# Patient Record
Sex: FEMALE | Race: WHITE | NOT HISPANIC OR LATINO
[De-identification: names, ages, dates, MRNs, and addresses within clinical notes are randomized per-mention and may not be internally consistent; named-entity substitution may affect disease eponyms.]

---

## 2021-12-09 PROBLEM — Z00.00 ENCOUNTER FOR PREVENTIVE HEALTH EXAMINATION: Status: ACTIVE | Noted: 2021-12-09

## 2021-12-14 ENCOUNTER — NON-APPOINTMENT (OUTPATIENT)
Age: 31
End: 2021-12-14

## 2021-12-15 ENCOUNTER — NON-APPOINTMENT (OUTPATIENT)
Age: 31
End: 2021-12-15

## 2021-12-15 ENCOUNTER — APPOINTMENT (OUTPATIENT)
Dept: BREAST CENTER | Facility: CLINIC | Age: 31
End: 2021-12-15
Payer: COMMERCIAL

## 2021-12-15 VITALS
BODY MASS INDEX: 25.11 KG/M2 | HEIGHT: 67 IN | SYSTOLIC BLOOD PRESSURE: 114 MMHG | DIASTOLIC BLOOD PRESSURE: 79 MMHG | WEIGHT: 160 LBS | HEART RATE: 61 BPM

## 2021-12-15 DIAGNOSIS — Z78.9 OTHER SPECIFIED HEALTH STATUS: ICD-10-CM

## 2021-12-15 PROCEDURE — 99203 OFFICE O/P NEW LOW 30 MIN: CPT

## 2021-12-15 NOTE — CONSULT LETTER
[Dear  ___] : Dear  [unfilled], [( Thank you for referring [unfilled] for consultation for _____ )] : Thank you for referring [unfilled] for consultation for [unfilled] [Please see my note below.] : Please see my note below. [Consult Closing:] : Thank you very much for allowing me to participate in the care of this patient.  If you have any questions, please do not hesitate to contact me. [Sincerely,] : Sincerely, [DrSelma  ___] : Dr. ENRIQUEZ [FreeTextEntry3] : Christen Thomson MS DO\par Breast Surgeon\par [facility] \[place] [address]\par

## 2021-12-15 NOTE — PHYSICAL EXAM
[Normocephalic] : normocephalic [Atraumatic] : atraumatic [Supple] : supple [No Supraclavicular Adenopathy] : no supraclavicular adenopathy [Examined in the supine and seated position] : examined in the supine and seated position [No dominant masses] : no dominant masses in right breast  [No dominant masses] : no dominant masses left breast [No Nipple Retraction] : no left nipple retraction [No Nipple Discharge] : no left nipple discharge [No Axillary Lymphadenopathy] : no left axillary lymphadenopathy [No Edema] : no edema [No Rashes] : no rashes [No Ulceration] : no ulceration [Symmetrical] : symmetrical [de-identified] : KYLE's EDMONDQ [de-identified] : KYLE's JUNIE

## 2021-12-15 NOTE — HISTORY OF PRESENT ILLNESS
[FreeTextEntry1] : This is a 31 year old female referred by Dr. Hernandez for family history of breast CA. She has no breast complaints today. She has an 8 month old daughter. She has never had any breast imaging. Her mother is Angelina Chacko.\par \par She does not do SBE. \par She has not noticed a change in her breast or a breast lump.\par She has not noticed a change in her nipple or nipple area.\par She has not noticed a change in the skin of the breast.\par She is not experiencing nipple discharge.\par She is not experiencing breast pain.\par She has not noticed a lump or lymph node under the armpit. \par \par BREAST CANCER RISK FACTORS\par Menarche: 13 \par Date of LMP:  11/26/2021\par Menopause: pre \par Grav:  1    Para: 1 ( 8 month old daughter) \par Age at first live birth: 31\par Nursed: No \par Hysterectomy: N \par Oophorectomy: N \par OCP: Yes in the past for about 16 years \par HRT: No \par Last pap/pelvic exam: 6/2021 WNL  Dr. Hernandez [facility] \par Related family history: mother DCIS age 60, mat grandmother breast CA age 66, mat aunt breast CA age 40,mat cousin breast and ovarian Ca age 60s, mat cousin breast age 40, mat great grandmother ovarian CA 60s\par Ashkenazi: yes \par Mastery risk assessment: BRCAPRO 27.4% TCv7 40.8% TCv8 40.8% Jake 21.6%\par BRCA testing: mother tested negative\par Bra size: 36DD\par \par Last mammogram: never                              Location:\par Report reviewed.                                 Images reviewed.\par Results:\par \par Last ultrasound:   never                                Location:\par Report reviewed.                                 Images reviewed. \par Results:\par \par Last MRI:   never                                          Location:\par Report reviewed.\par

## 2021-12-15 NOTE — REVIEW OF SYSTEMS
[Lower Back Pain] : lower back pain [Problems With Nursing] : problems with nursing [Negative] : Heme/Lymph

## 2021-12-15 NOTE — ASSESSMENT
[FreeTextEntry1] : 32 yo female with high risk\par reviewed her risk status she is high risk and we will re-run at age 35\par We reviewed risk reduction strategies including maintaining a BMI <25, limiting red meat intake and alcoholic beverages to 3 per week and exercise (150 min/ week low intensity or 75 min/week high intensity). And maintaining a normal vitamin D level.\par \par discussed MRI, she is amenable to this, reviewed risk of false positives\par plan MRI 3/2022 and eventual 6 month interval\par plan mg/sono asap\par referral to medical genetics\par f/u Dr. Hernandez 6 months and me in 1 year\par She knows to call or return sooner should any concerns or questions arise.\par \par

## 2022-01-21 ENCOUNTER — NON-APPOINTMENT (OUTPATIENT)
Age: 32
End: 2022-01-21

## 2022-02-10 ENCOUNTER — NON-APPOINTMENT (OUTPATIENT)
Age: 32
End: 2022-02-10

## 2022-02-17 ENCOUNTER — NON-APPOINTMENT (OUTPATIENT)
Age: 32
End: 2022-02-17

## 2022-02-17 DIAGNOSIS — R92.8 OTHER ABNORMAL AND INCONCLUSIVE FINDINGS ON DIAGNOSTIC IMAGING OF BREAST: ICD-10-CM

## 2022-08-22 ENCOUNTER — RESULT REVIEW (OUTPATIENT)
Age: 32
End: 2022-08-22

## 2022-08-26 ENCOUNTER — NON-APPOINTMENT (OUTPATIENT)
Age: 32
End: 2022-08-26

## 2022-08-30 ENCOUNTER — NON-APPOINTMENT (OUTPATIENT)
Age: 32
End: 2022-08-30

## 2022-12-19 ENCOUNTER — APPOINTMENT (OUTPATIENT)
Dept: BREAST CENTER | Facility: CLINIC | Age: 32
End: 2022-12-19

## 2022-12-19 VITALS
SYSTOLIC BLOOD PRESSURE: 103 MMHG | DIASTOLIC BLOOD PRESSURE: 67 MMHG | HEART RATE: 6 BPM | WEIGHT: 162 LBS | OXYGEN SATURATION: 100 % | BODY MASS INDEX: 25.37 KG/M2

## 2022-12-19 DIAGNOSIS — Z3A.01 LESS THAN 8 WEEKS GESTATION OF PREGNANCY: ICD-10-CM

## 2022-12-19 PROCEDURE — 99213 OFFICE O/P EST LOW 20 MIN: CPT

## 2022-12-19 NOTE — PHYSICAL EXAM
[Normocephalic] : normocephalic [Atraumatic] : atraumatic [Supple] : supple [No Supraclavicular Adenopathy] : no supraclavicular adenopathy [Examined in the supine and seated position] : examined in the supine and seated position [Symmetrical] : symmetrical [No dominant masses] : no dominant masses in right breast  [No dominant masses] : no dominant masses left breast [No Nipple Retraction] : no left nipple retraction [No Nipple Discharge] : no left nipple discharge [No Axillary Lymphadenopathy] : no left axillary lymphadenopathy [No Edema] : no edema [No Rashes] : no rashes [No Ulceration] : no ulceration [de-identified] : KYLE's EDMONDQ [de-identified] : KYLE's JUNIE

## 2022-12-19 NOTE — ASSESSMENT
[FreeTextEntry1] : 31 yo female with high risk and dense breast\par reviewed her risk status she is high risk and we will re-run at age 35\par We reviewed risk reduction strategies including maintaining a BMI <25, limiting red meat intake and alcoholic beverages to 3 per week and exercise (150 min/ week low intensity or 75 min/week high intensity). And maintaining a normal vitamin D level.\par \par discussed MRI,she will not have MRI while pregnant\par plan mg/sono 2/2023-she will let me know if she elects not to get the imaging due to pregnancy\par she decided against medical genetics\par f/u Dr. Hernandez 6 months and me in 1 year\par She knows to call or return sooner should any concerns or questions arise.\par \par

## 2022-12-19 NOTE — CONSULT LETTER
[Dear  ___] : Dear  [unfilled], [Please see my note below.] : Please see my note below. [Consult Closing:] : Thank you very much for allowing me to participate in the care of this patient.  If you have any questions, please do not hesitate to contact me. [Sincerely,] : Sincerely, [DrSelma  ___] : Dr. ENRIQUEZ [Courtesy Letter:] : I had the pleasure of seeing your patient, [unfilled], in my office today. [FreeTextEntry3] : Christen Thomson MS DO\par Breast Surgeon\par [facility] \[place] [address]\par

## 2022-12-19 NOTE — HISTORY OF PRESENT ILLNESS
[FreeTextEntry1] : This is a 31 year old female referred by Dr. Hernandez for family history of breast CA. She has no breast complaints today. She has an 8 month old daughter. She has never had any breast imaging. Her mother is Angelina Chacko.\par She has no breast complaints today other than soreness bilaterally. She is going to Dr. Hernandez tomorrow for her new pregnancy -just found out yesterday!\par \par She does not do SBE. \par She has not noticed a change in her breast or a breast lump.\par She has not noticed a change in her nipple or nipple area.\par She has not noticed a change in the skin of the breast.\par She is not experiencing nipple discharge.\par She is not experiencing breast pain.\par She has not noticed a lump or lymph node under the armpit. \par \par BREAST CANCER RISK FACTORS\par Menarche: 13 \par Date of LMP:  11/26/2021\par Menopause: pre \par Grav:  1    Para: 1 ( 20 month old daughter) \par Age at first live birth: 31\par Nursed: No \par Hysterectomy: N \par Oophorectomy: N \par OCP: Yes in the past for about 16 years \par HRT: No \par Last pap/pelvic exam: 6/2021 WNL  Dr. Hernandez [facility] \par Related family history: mother DCIS age 60, mat grandmother breast CA age 66, mat aunt breast CA age 40,mat cousin breast and ovarian Ca age 60s, mat cousin breast age 40, mat great grandmother ovarian CA 60s\par Ashkenazi: yes \par Mastery risk assessment: BRCAPRO 27.4% TCv7 40.8% TCv8 40.8% Jake 21.6%\par BRCA testing: mother tested negative\par Bra size: 36DD\par \par Last mammogram: 8/23/2022 Right only                             Location: [place]\par Report reviewed.                                 Images reviewed.\par Results: Birads 3\par Right 12:00 105 x 1.2 cm stable lobulated mass. Rec 6m f/u diagnostic mammo\par \par Last ultrasound:   8/23/2022 Right targeted                               Location: [place]\par Report reviewed.                                 Images reviewed. \par Results: Birads 3\par Right 12:00 N6 1.5 x 1.0 x 0.6 cm stable hypoechoic lesion. Rec 6m f/u  u/s.\par \par Last MRI:   never                                          Location: \par Report reviewed.\par

## 2023-01-06 ENCOUNTER — TRANSCRIPTION ENCOUNTER (OUTPATIENT)
Age: 33
End: 2023-01-06

## 2023-03-05 ENCOUNTER — RESULT REVIEW (OUTPATIENT)
Age: 33
End: 2023-03-05

## 2024-01-07 ENCOUNTER — NON-APPOINTMENT (OUTPATIENT)
Age: 34
End: 2024-01-07

## 2024-01-12 ENCOUNTER — APPOINTMENT (OUTPATIENT)
Dept: BREAST CENTER | Facility: CLINIC | Age: 34
End: 2024-01-12
Payer: COMMERCIAL

## 2024-01-12 VITALS
HEART RATE: 66 BPM | WEIGHT: 162 LBS | BODY MASS INDEX: 25.43 KG/M2 | HEIGHT: 67 IN | DIASTOLIC BLOOD PRESSURE: 83 MMHG | SYSTOLIC BLOOD PRESSURE: 122 MMHG

## 2024-01-12 DIAGNOSIS — Z80.3 FAMILY HISTORY OF MALIGNANT NEOPLASM OF BREAST: ICD-10-CM

## 2024-01-12 DIAGNOSIS — R92.30 DENSE BREASTS, UNSPECIFIED: ICD-10-CM

## 2024-01-12 DIAGNOSIS — Z86.59 PERSONAL HISTORY OF OTHER MENTAL AND BEHAVIORAL DISORDERS: ICD-10-CM

## 2024-01-12 DIAGNOSIS — Z12.31 ENCOUNTER FOR SCREENING MAMMOGRAM FOR MALIGNANT NEOPLASM OF BREAST: ICD-10-CM

## 2024-01-12 DIAGNOSIS — Z80.41 FAMILY HISTORY OF MALIGNANT NEOPLASM OF OVARY: ICD-10-CM

## 2024-01-12 PROCEDURE — 99214 OFFICE O/P EST MOD 30 MIN: CPT

## 2024-01-12 RX ORDER — MULTIVITAMIN
TABLET ORAL
Refills: 0 | Status: ACTIVE | COMMUNITY

## 2024-01-12 RX ORDER — PNV NO.95/FERROUS FUM/FOLIC AC 28MG-0.8MG
TABLET ORAL
Refills: 0 | Status: DISCONTINUED | COMMUNITY
End: 2024-01-12

## 2024-01-12 NOTE — PHYSICAL EXAM
[Normocephalic] : normocephalic [Atraumatic] : atraumatic [Supple] : supple [No Supraclavicular Adenopathy] : no supraclavicular adenopathy [Examined in the supine and seated position] : examined in the supine and seated position [Symmetrical] : symmetrical [No dominant masses] : no dominant masses in right breast  [No dominant masses] : no dominant masses left breast [No Nipple Retraction] : no left nipple retraction [No Nipple Discharge] : no left nipple discharge [No Axillary Lymphadenopathy] : no left axillary lymphadenopathy [No Edema] : no edema [No Rashes] : no rashes [No Ulceration] : no ulceration [Breast Nipple Inversion Left] : nipple inverted [de-identified] : KYLE's EDMONDQ [de-identified] : FGC's UOQ, inverted nipple (chronic)

## 2024-01-12 NOTE — CONSULT LETTER
[Dear  ___] : Dear  [unfilled], [Courtesy Letter:] : I had the pleasure of seeing your patient, [unfilled], in my office today. [Please see my note below.] : Please see my note below. [Consult Closing:] : Thank you very much for allowing me to participate in the care of this patient.  If you have any questions, please do not hesitate to contact me. [Sincerely,] : Sincerely, [DrSelma  ___] : Dr. ENRIQUEZ [FreeTextEntry3] : Christen Thomson MS DO\par  Breast Surgeon\par  [facility] \[place] [address]\par

## 2024-01-12 NOTE — ASSESSMENT
[FreeTextEntry1] : 32 yo female with high risk and dense breast  reviewed her risk status she is high risk and we will re-run at age 35  We reviewed risk reduction strategies including maintaining a BMI <25, limiting red meat intake and alcoholic beverages to 3 per week and exercise (150 min/ week low intensity or 75 min/week high intensity). And maintaining a normal vitamin D level.  discussed MRI,she has hesitation given her claustrophobia  plan mg/sono 3/2024  she decided to have medical genetic counseling  f/u Dr. Hernandez 6 months and me in 1 year  She knows to call or return sooner should any concerns or questions arise.

## 2024-01-12 NOTE — HISTORY OF PRESENT ILLNESS
[FreeTextEntry1] : This is a 33 year old female referred by Dr. Hernandez for family history of breast CA. She has no breast complaints today. Her mother is Angelina Chacko. She has no breast complaints today.  She does not do SBE.  She has not noticed a change in her breast or a breast lump. She has not noticed a change in her nipple or nipple area. She has not noticed a change in the skin of the breast. She is not experiencing nipple discharge. She is not experiencing breast pain. She has not noticed a lump or lymph node under the armpit.   BREAST CANCER RISK FACTORS Menarche: 13  Date of LMP: 12/21/2023 Menopause: pre  Grav:  2    Para: 1 ( 3y old daughter)  Age at first live birth: 31 Nursed: No  Hysterectomy: N  Oophorectomy: N  OCP: Yes in the past for about 16 years  HRT: No  Last pap[facility]pelvic exam:  2023 [facility]  (upcoming appt 07/2024 Related family history: mother DCIS age 60, mat grandmother breast CA age 66, mat aunt breast CA age 40,mat cousin breast and ovarian Ca age 60s, mat cousin breast age 40, mat great grandmother ovarian CA 60s Ashkenazi: yes  Mastery risk assessment: BRCAPRO 27.4% TCv7 40.8% TCv8 40.8% Jake 21.6% BRCA testing: mother tested negative Bra size: 36DD  Last mammogram: 03/06/2023                             Location: [facility] Report reviewed.                                              Images reviewed. Results: Birads 3 Heterogeneously Dense  12:00 in the right breast approximately 8 cm deep to the nipple there is a stable 1.8 x 1 cm lobulated mass.  Last ultrasound:  03/06/2023                            Location: [facility] Report reviewed.                                          Images reviewed.  Results: Birads 3 the breast parenchyma demonstrates a heterogeneous background echotexture mixed fatty and fibroglandular.   Last MRI:   never                                          Location:  Report reviewed.

## 2024-04-15 ENCOUNTER — RESULT REVIEW (OUTPATIENT)
Age: 34
End: 2024-04-15

## 2024-04-23 ENCOUNTER — APPOINTMENT (OUTPATIENT)
Dept: PEDIATRIC MEDICAL GENETICS | Facility: CLINIC | Age: 34
End: 2024-04-23
Payer: COMMERCIAL

## 2024-04-23 PROCEDURE — 96040: CPT | Mod: 95

## 2024-08-19 ENCOUNTER — NON-APPOINTMENT (OUTPATIENT)
Age: 34
End: 2024-08-19

## 2025-01-13 ENCOUNTER — APPOINTMENT (OUTPATIENT)
Dept: BREAST CENTER | Facility: CLINIC | Age: 35
End: 2025-01-13
Payer: COMMERCIAL

## 2025-01-13 VITALS
HEART RATE: 70 BPM | HEIGHT: 67 IN | SYSTOLIC BLOOD PRESSURE: 116 MMHG | WEIGHT: 180 LBS | BODY MASS INDEX: 28.25 KG/M2 | DIASTOLIC BLOOD PRESSURE: 73 MMHG

## 2025-01-13 DIAGNOSIS — Z12.31 ENCOUNTER FOR SCREENING MAMMOGRAM FOR MALIGNANT NEOPLASM OF BREAST: ICD-10-CM

## 2025-01-13 DIAGNOSIS — R92.30 DENSE BREASTS, UNSPECIFIED: ICD-10-CM

## 2025-01-13 DIAGNOSIS — R92.2 INCONCLUSIVE MAMMOGRAM: ICD-10-CM

## 2025-01-13 PROCEDURE — 99214 OFFICE O/P EST MOD 30 MIN: CPT

## 2025-08-08 ENCOUNTER — TRANSCRIPTION ENCOUNTER (OUTPATIENT)
Age: 35
End: 2025-08-08

## 2025-08-11 ENCOUNTER — TRANSCRIPTION ENCOUNTER (OUTPATIENT)
Age: 35
End: 2025-08-11